# Patient Record
Sex: MALE | Race: WHITE | NOT HISPANIC OR LATINO | Employment: FULL TIME | URBAN - METROPOLITAN AREA
[De-identification: names, ages, dates, MRNs, and addresses within clinical notes are randomized per-mention and may not be internally consistent; named-entity substitution may affect disease eponyms.]

---

## 2017-01-20 ENCOUNTER — OFFICE VISIT (OUTPATIENT)
Dept: FAMILY MEDICINE | Facility: CLINIC | Age: 32
End: 2017-01-20
Payer: COMMERCIAL

## 2017-01-20 VITALS
WEIGHT: 155 LBS | TEMPERATURE: 97.9 F | OXYGEN SATURATION: 97 % | BODY MASS INDEX: 24.27 KG/M2 | DIASTOLIC BLOOD PRESSURE: 72 MMHG | RESPIRATION RATE: 16 BRPM | SYSTOLIC BLOOD PRESSURE: 114 MMHG | HEART RATE: 79 BPM

## 2017-01-20 DIAGNOSIS — R07.0 THROAT PAIN: Primary | ICD-10-CM

## 2017-01-20 LAB
DEPRECATED S PYO AG THROAT QL EIA: NORMAL
MICRO REPORT STATUS: NORMAL
SPECIMEN SOURCE: NORMAL

## 2017-01-20 PROCEDURE — 87880 STREP A ASSAY W/OPTIC: CPT | Performed by: PHYSICIAN ASSISTANT

## 2017-01-20 PROCEDURE — 87081 CULTURE SCREEN ONLY: CPT | Performed by: PHYSICIAN ASSISTANT

## 2017-01-20 PROCEDURE — 99202 OFFICE O/P NEW SF 15 MIN: CPT | Performed by: PHYSICIAN ASSISTANT

## 2017-01-20 ASSESSMENT — ENCOUNTER SYMPTOMS
HEADACHES: 0
CHILLS: 0
VOMITING: 0
ABDOMINAL PAIN: 0
NAUSEA: 0
FOCAL WEAKNESS: 0
COUGH: 1
DIARRHEA: 0
FEVER: 0
SHORTNESS OF BREATH: 0
SORE THROAT: 1

## 2017-01-20 NOTE — NURSING NOTE
"Chief Complaint   Patient presents with     Pharyngitis       Initial /72 mmHg  Pulse 79  Temp(Src) 97.9  F (36.6  C) (Oral)  Resp 16  Wt 155 lb (70.308 kg)  SpO2 97% Estimated body mass index is 24.27 kg/(m^2) as calculated from the following:    Height as of 7/20/12: 5' 7\" (1.702 m).    Weight as of this encounter: 155 lb (70.308 kg).  BP completed using cuff size: norris Mars MA       "

## 2017-01-20 NOTE — PROGRESS NOTES
SUBJECTIVE:                                                    Moris Torres is a 31 year old male who presents to clinic today for the following health issues:      RESPIRATORY SYMPTOMS      Duration: 6 days    Description  sore throat, worst in AM    Severity: mild    Accompanying signs and symptoms: cough, nasal congestion, fatigue (these have improved)    History (predisposing factors):  none    Precipitating or alleviating factors: None    Therapies tried and outcome:  rest and fluids       Additional complaints: None    HPI additional notes:  Pt denies dysphagia, fever, ear pain, or rash. No known sick contacts.       Chart Review:  History   Smoking status     Passive Smoke Exposure - Never Smoker     Types: Cigarettes   Smokeless tobacco     Not on file     No flowsheet data found.  No flowsheet data found.      Patient Active Problem List   Diagnosis     CARDIOVASCULAR SCREENING; LDL GOAL LESS THAN 160     History reviewed. No pertinent past surgical history.  Problem list, Medication list, Allergies, Medical/Social/Surg hx reviewed in [facility], updated as appropriate.    HPI      Review of Systems   Constitutional: Positive for malaise/fatigue. Negative for fever and chills.   HENT: Positive for congestion (improved) and sore throat.    Respiratory: Positive for cough (improved). Negative for shortness of breath.    Cardiovascular: Negative for chest pain.   Gastrointestinal: Negative for nausea, vomiting, abdominal pain and diarrhea.   Skin: Negative for rash.   Neurological: Negative for focal weakness and headaches.   All other systems reviewed and are negative.        Physical Exam   Constitutional: He is oriented to person, place, and time and well-developed, well-nourished, and in no distress.   HENT:   Head: Normocephalic and atraumatic.   Right Ear: Tympanic membrane, external ear and ear canal normal.   Left Ear: Tympanic membrane, external ear and ear canal normal.   Nose: Nose normal.    Mouth/Throat: Uvula is midline and mucous membranes are normal. Posterior oropharyngeal erythema present. No oropharyngeal exudate, posterior oropharyngeal edema or tonsillar abscesses.   Postnasal drip     Cardiovascular: Normal rate, regular rhythm and normal heart sounds.    Pulmonary/Chest: Effort normal and breath sounds normal.   Musculoskeletal: Normal range of motion.   Neurological: He is alert and oriented to person, place, and time. Gait normal.   Skin: Skin is warm and dry.   Nursing note and vitals reviewed.      Vital Signs  /72 mmHg  Pulse 79  Temp(Src) 97.9  F (36.6  C) (Oral)  Resp 16  Wt 155 lb (70.308 kg)  SpO2 97%   Body mass index is 24.27 kg/(m^2).    Diagnostic Test Results:  Results for orders placed or performed in visit on 01/20/17 (from the past 24 hour(s))   Strep, Rapid Screen   Result Value Ref Range    Specimen Description Throat     Rapid Strep A Screen       NEGATIVE: No Group A streptococcal antigen detected by immunoassay, await   culture report.      Micro Report Status FINAL 01/20/2017        ASSESSMENT/PLAN:                                                        ICD-10-CM    1. Throat pain R07.0 Strep, Rapid Screen     Beta strep group A culture   Lungs CTAB, afebrile, NAD.  Strep negative. Suspect sore throat secondary to postnasal drip due to recent URI sx. Supportive therapies discussed.    I have discussed any lab or imaging results, the patient's diagnosis, and my plan of treatment with the patient and/or family. Patient is aware to come back in if with worsening symptoms or if no relief despite treatment plan.  Patient voiced understanding and had no further questions.       Follow Up: Return if symptoms worsen or fail to improve.    ZHEN Villegas, PA-C  [facility]

## 2017-01-22 LAB
BACTERIA SPEC CULT: NORMAL
MICRO REPORT STATUS: NORMAL
SPECIMEN SOURCE: NORMAL

## 2021-05-28 ENCOUNTER — RECORDS - HEALTHEAST (OUTPATIENT)
Dept: ADMINISTRATIVE | Facility: CLINIC | Age: 36
End: 2021-05-28

## 2024-07-31 ENCOUNTER — OFFICE VISIT (OUTPATIENT)
Dept: FAMILY MEDICINE | Facility: CLINIC | Age: 39
End: 2024-07-31
Payer: COMMERCIAL

## 2024-07-31 VITALS
RESPIRATION RATE: 16 BRPM | DIASTOLIC BLOOD PRESSURE: 67 MMHG | HEART RATE: 55 BPM | TEMPERATURE: 98.1 F | SYSTOLIC BLOOD PRESSURE: 104 MMHG | WEIGHT: 170 LBS | OXYGEN SATURATION: 96 %

## 2024-07-31 DIAGNOSIS — T14.8XXA MUSCLE STRAIN: Primary | ICD-10-CM

## 2024-07-31 PROCEDURE — 99203 OFFICE O/P NEW LOW 30 MIN: CPT | Performed by: PHYSICIAN ASSISTANT

## 2024-07-31 RX ORDER — CYCLOBENZAPRINE HCL 5 MG
10 TABLET ORAL 3 TIMES DAILY PRN
Qty: 30 TABLET | Refills: 0 | Status: SHIPPED | OUTPATIENT
Start: 2024-07-31 | End: 2024-08-07
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
## 2024-07-31 NOTE — LETTER
[facility]  [address] [facility] [address]  Phone: [phone]  Fax: [phone]    July 31, 2024        Moris Torres  [address] [place] [id number]          To whom it may concern:    RE: Moris Torres    He is excused from work for 7/31/2024 - 8/6/2024.    Please contact me for questions or concerns.      Sincerely,      Carly Carbajal

## 2024-07-31 NOTE — PATIENT INSTRUCTIONS
You were seen today for a muscle strain.    Symptom management:  - Take the flexeril to relax the muscle, start with just 5 mg, if you tolerate that alright may take up to 10 mg at a time up to 3 times a day as needed.  - May use acetaminophen 500-1000 mg for first 3 hours (not to exceed 4000 mg in one day), then ibuprofen 400-600 mg with food for the next 3 hours, and alternate as needed  - Heat pads as tolerated  - Rest with occasional light stretching    Reasons to be seen for re-evaluation:  - Develop worsening numbness or tingling in the hand and fingers  - Sharp shooting pain that radiates from the injury  - Worsening swelling or bruising develops  - Reduced strength of muscles  - Loss of bowel or bladder function  - Fever of 100.4 or higher    Otherwise, if pain is not improving after 1 week, return for re-evaluation or see your primary care provider.